# Patient Record
(demographics unavailable — no encounter records)

---

## 2018-03-20 NOTE — XMS REPORT
Clinical Summary

 Created on: 2018



Haroldo Varghese

External Reference #: [id number]

: 1966

Sex: Male



Demographics







 Address  [address] [place]  [address]

 

 Home Phone  [phone]

 

 Preferred Language  English

 

 Marital Status  

 

 Nondenominational Affiliation  Unknown

 

 Race  Unknown

 

 Ethnic Group  Non-





Author







 Author  Chris [facility]

 

 Organization  [facility]

 

 Address  Unknown

 

 Phone  Unavailable







Support







 Name  Relationship  Address  Phone

 

 Hernandez,Kandy  [employer]  [address] [place]  [address]  [phone]







Care Team Providers







 Care Team Member Name  Role  Phone

 

 ABIGAIL Miller MD  PCP  [phone]







Allergies







    



  Active Allergy   Reactions   Severity   Noted Date   Comments

 

    



  Iodinated Contrast- Oral   Hives    2016 



  And Iv Dye    

 

    



  Sulfa (Sulfonamide   Rash   Low   2016 



  Antibiotics)    

 

    



  Sulfacetamide Sodium     2016 







Current Medications







      



  Prescription   Sig.   Disp.   Refills   Start   End Date   Status



      Date  

 

      



  ACCU-CHEK LENORA PLUS TEST      20    Active



  STRP strip test strips      16  

 

      



  buPROPion XL (WELLBUTRIN     5   20    Active



  XL) 300 MG 24 hr tablet      16  

 

      



  doxazosin (CARDURA) 8 MG      20    Active



  tablet      16  

 

      



  VASCEPA 1 gram capsule   TK 1 C PO BID    3   20    Active



      16  

 

      



  lisinopril      20    Active



  (PRINIVIL,ZESTRIL) 10 MG      16  



  tablet      

 

      



  pioglitazone (ACTOS) 30      03/10/20    Active



  MG tablet      16  

 

      



  K-TAB 10 mEq CR tablet      20    Active



      16  

 

      



  CRESTOR 10 mg tablet   TK 1 T PO QD    1   20    Active



      16  

 

      



  sertraline (ZOLOFT) 100      20    Active



  MG tablet      16  

 

      



  RAPAMUNE 1 mg tablet      20    Active



      16  

 

      



  traZODone (DESYREL) 50 MG      20    Active



  tablet      16  

 

      



  atorvastatin (LIPITOR) 40   TK 1 T PO DAILY    2   20    Active



  MG tablet      16  

 

      



  NEORAL 25 mg capsule      10/03/20    Active



      16  

 

      



  ondansetron ODT   Take 1 tablet (4 mg   30 tablet   1   20    Active



  (ZOFRAN-ODT) 4 MG   total) by mouth every 8     17  



  disintegrating tablet   (eight) hours as needed     



   for nausea or vomiting.     

 

      



  albuterol (PROVENTIL) 2.5   Take 3 mL (2.5 mg total)   75 mL   1   20   Active



  mg /3 mL (0.083 %)   by nebulization every 6     17   18 



  nebulizer   (six) hours as needed for     



  solutionIndications:   wheezing.     



  Viral bronchitis      

 

      



  albuterol (PROAIR HFA) 90   INHALE 2 PUFFS BY MOUTH   8.5 g   3   20    
Active



  mcg/actuation   EVERY 6 HOURS AS NEEDED     17  



  inhalerIndications: Cough   FOR SHORTNESS OF BREATH     

 

      



  VITAMIN D2 50,000 unit   Take 1 capsule (50,000   12 capsule   1   20  
  Active



  capsuleIndications:   Units total) by mouth     17  



  Vitamin D deficiency   once a week.     

 

      



  ondansetron ODT     0   20   Discontin



  (ZOFRAN-ODT) 4 MG      16   17   ued



  disintegrating tablet      

 

      



  VITAMIN D2 50,000 unit   Take 1 capsule (50,000   12 capsule   0   20   Discontin



  capsule   Units total) by mouth     16   17   ued



   once a week.     

 

      



  allopurinol (ZYLOPRIM)      10/25/20   06/26/20   Discontin



  100 MG tablet      16   17   ued

 

      



  albuterol (PROAIR   Inhale 2 puffs every 6   18 g   1   12/15/20   06/21/20   
Discontin



  HFA,PROVENTIL   (six) hours as needed for     16   17   ued



  HFA,VENTOLIN HFA) 90   shortness of breath.     



  mcg/actuation      



  inhalerIndications: Cough      

 

      



  amoxicillin-pot   Take 1 tablet by mouth 2   20 tablet   0   20   



  clavulanate (AUGMENTIN)   (two) times a day for 10     17   17 



  875-125 mg per   days.     



  tabletIndications: Acute      



  non-recurrent maxillary      



  sinusitis, Acute      



  suppurative otitis media      



  of both ears without      



  spontaneous rupture of      



  tympanic membranes,      



  recurrence not specified      

 

      



  PROAIR HFA 90   INHALE 2 PUFFS BY MOUTH   8.5 g   0   20   
Discontin



  mcg/actuation   EVERY 6 HOURS AS NEEDED     17   17   ued



  inhalerIndications: Cough   FOR SHORTNESS OF BREATH     

 

      



  VITAMIN D2 50,000 unit   Take 1 capsule (50,000   12 capsule   0   20   Discontin



  capsuleIndications:   Units total) by mouth     17   17   ued



  Vitamin D deficiency   once a week.     

 

      



  VITAMIN D2 50,000 unit   Take 1 capsule (50,000   12 capsule   1   20   Discontin



  capsuleIndications:   Units total) by mouth     17   17   ued



  Vitamin D deficiency   once a week.     

 

      



  codeine-guaifenesin   Take 5 mL by mouth 3   236 mL   0   20
   



  (GUAIFENESIN AC)    (three) times a day as     17   17 



  mg/5 mL   needed for cough for up     



  liquidIndications: Viral   to 10 days.     



  bronchitis      

 

      



  erythromycin 0.5%   Administer 1 application   1 g   0   09/26/20   10/03/20 
  



  (ILOTYCIN) 5 mg/gram (0.5   into the left eye 2 (two)     17   17 



  %) ophthalmic   times a day for 7 days.     



  ointmentIndications:      



  Acute bacterial      



  conjunctivitis of left      



  eye      









      



  Hospital, Clinic, or   Ordered Dose   Route   Frequency   Start   End Date   
Status



  Other Facility      Date  



  Administered Medication      

 

      



  albuterol (PROVENTIL)   2.5 mg   nebu   Once   20    Active



  nebulizer solution 2.5      17  



  mgIndications: Cough, SOB      



  (shortness of breath),      



  Viral bronchitis      







Active Problems







 



  Problem   Noted Date

 

 



  Abscess   2017

 

 



  Acute pharyngitis   2017

 

 



  Acute upper respiratory infection   2017

 

 



  Ankle joint pain   2017

 

 



  Nausea   2017

 

 



  Multiple-type hyperlipidemia   2017

 

 



  Abdominal distension   2017

 

 



  Joint pain   2017

 

 



  Iron deficiency anemia   2017

 

 



  Insomnia   2017

 

 



  Hyperlipidemia   2017

 

 



  History of kidney transplant   2017

 

 



  Headache   2017

 

 



  Gouty arthropathy   2017

 

 



  Fever   2017

 

 



  Elevated blood-pressure reading without diagnosis of hypertension   2017

 

 



  Diverticulitis of sigmoid colon   2017

 

 



  Diverticulosis of intestine   2017

 

 



  Diarrhea   2017

 

 



  Benign essential hypertension   2017

 

 



  Chest pain   2017

 

 



  Conjunctivitis   2017

 

 



  Obstructive sleep apnea syndrome in adult   2017

 

 



  Cellulitis of orbit   2017

 

 



  Pain of lower extremity   2017

 

 



  Otitis media   2017

 

 



  Psychogenic headache   2017

 

 



  Renal disorder   2017

 

 



  Right flank pain   2017

 

 



  Arthralgia of shoulder   2017

 

 



  Stomatitis   2017

 

 



  Testicular hypofunction   2017

 

 



  Tremor   2017

 

 



  Urinary tract infection   2017

 

 



  Vitamin D deficiency   2017

 

 



  Diabetes mellitus   2016

 

 



  Overview:



  Overview:



  IDDM PREDNISONE INDUCED



  Overview:



  IDDM PREDNISONE INDUCED

 

 



  Gout   2016

 

 



  Hypertension   2016

 

 



  Congenital cystic kidney disease   2016

 

 



  Sleep apnea   2016

 

 



  Overview:



  Overview:



  uses cpap



  Overview:



  uses cpap

 

 



  Diverticulitis of large intestine   2016

 

 



  Benign prostatic hyperplasia   2016

 

 



  Chronic kidney disease, stage III (moderate)   2016

 

 



  Depression   2016

 

 



  Left lower quadrant pain   2016







Encounters







    



  Date   Type   Specialty   Care Team   Description

 

    



  2018   Lab   Lab   Kathi Miller MD   Multiple-type



      hyperlipidemia;



      Vitamin D deficiency;



      IGT (impaired glucose



      tolerance);



      Anemia, unspecified type

 

    



  2018   Office Visit   Internal Medicine   Kathi Miller MD   
Multiple-type



      hyperlipidemia (Primary



      Dx);



      Vitamin D deficiency;



      Need for influenza



      vaccination;



      Anemia, unspecified type;





      IGT (impaired glucose



      tolerance);



      History of diverticulitis

 

    



  2017   Office Visit   Internal Medicine   Kathi Miller MD   
Diabetes mellitus with



      insulin therapy (Primary



      Dx);



      Benign essential



      hypertension;



      Vitamin D deficiency;



      Chronic kidney disease,



      stage III (moderate);



      Acute bacterial



      conjunctivitis of left



      eye;



      Need for influenza



      vaccination

 

    



  2017   Refill   Internal Medicine   Citlaly Cruz MA   Cough

 

    



  2017   Office Visit   Internal Medicine   Kathi Miller MD   
Cough (Primary Dx);



      SOB (shortness of



      breath);



      Viral bronchitis

 

    



  2017   Telephone   Family Medicine   Ninoska Pope LVN 

 

    



  2017   Refill   Internal Medicine   Kathi Miller MD   Acute non
-recurrent



      maxillary sinusitis;



      Acute suppurative otitis



      media of both ears



      without spontaneous



      rupture of tympanic



      membranes, recurrence not



      specified

 

    



  2017   Office Visit   Internal Medicine   Kathi Miller MD   
Hoarseness (Primary Dx);



      Multiple-type



      hyperlipidemia;



      Vitamin D deficiency;



      Elevated liver function



      tests;



      Gastroesophageal reflux



      disease with esophagitis

 

    



  2017   Refill   Family Medicine   Sarika Ramsey MD   Cough

 

    



  2017   Lab   Lab   Kathi Miller MD   Multiple-type



      hyperlipidemia;



      Other iron deficiency



      anemia;



      Vitamin D deficiency;



      Type 2 diabetes mellitus



      with chronic kidney



      disease, without



      long-term current use of



      insulin, unspecified CKD



      stage;



      Localized edema;



      Benign non-nodular



      prostatic hyperplasia



      without lower urinary



      tract symptoms;



      Screening for prostate



      cancer;



      Need for hepatitis C



      screening test

 

    



  2017   Office Visit   Internal Medicine   Kathi Miller MD   
Acute non-recurrent



      maxillary sinusitis



      (Primary Dx);



      Acute suppurative otitis



      media of both ears



      without spontaneous



      rupture of tympanic



      membranes, recurrence not



      specified



after 2017



Immunizations







  



  Name   Dates Previously Given   Next Due

 

  



  INFLUENZA QUAD PF   2018, 2017 

 

  



  Influenza, Unspecified   2016, 2016 







Family History







   



  Medical History   Relation   Name   Comments

 

   



  Lupus   Daughter  

 

   



  Anorexia nervosa   Daughter  

 

   



  Diabetes   Father    after transplant

 

   



  Kidney failure   Father    [facility]KD

 

   



  Kidney failure   Son    PCKD









   



  Relation   Name   Status   Comments

 

   



  Daughter   

 

   



  Daughter   

 

   



  Father   

 

   



  Son   







Social History







    



  Tobacco Use   Types   Packs/Day   Years Used   Date

 

    



  Never Smoker    









 Tobacco Cessation: Counseling Given: No











   



  Alcohol Use   Drinks/Week   oz/Week   Comments

 

   



  No     1/year









 



  Sex Assigned at Birth   Date Recorded

 

 



  Not on file 







Last Filed Vital Signs







  



  Vital Sign   Reading   Time Taken

 

  



  Blood Pressure   112/73   2018 10:36 AM CST

 

  



  Pulse   85   2018 10:36 AM CST

 

  



  Temperature   36.6   C (97.8   F)   2018 10:36 AM CST

 

  



  Respiratory Rate   18   2018 10:36 AM CST

 

  



  Oxygen Saturation   95%   2018 10:36 AM CST

 

  



  Inhaled Oxygen   -   -



  Concentration  

 

  



  Weight   126 kg (278 lb)   2018 10:36 AM CST

 

  



  Height   172.7 cm (5' 8")   2017 10:15 AM CDT

 

  



  Body Mass Index   42.27   2018 10:36 AM CST







Plan of Treatment







   



  Health Maintenance   Due Date   Last Done   Comments

 

   



  FOOT EXAM   1976  

 

   



  OPHTHALMOLOGY EXAM   1976  

 

   



  COLONOSCOPY   2016  

 

   



  INFLUENZA VACCINE   Completed   2018, 2017, 2016, 



    Additional history exists 







Results

* Vitamin D 25 hydroxy level (2018 11:34 AM)



Only the most recent of 2 results within the time period is included.





  



  Component   Value   Ref Range

 

  



  Vitamin D, 25-hydroxy   22.6 (L)   30.0 - 100.0 ng/mL



   Comment: 



   Vitamin D deficiency has been defined by the 



   [place] of 



   Medicine and an Endocrine Society practice 



   guideline as a 



   level of serum 25-OH vitamin D less than 20 ng/mL 



   (1,2). 



   The Endocrine Society went on to further define 



   vitamin D 



   insufficiency as a level between 21 and 29 ng/mL 



   (2). 



   1. IOM ([place] [employer]). 2010. Dietary 



   reference 



   intakes for calcium and D. [place]: The 



   National Academies Press. 



   2. Michael MF, Jasen NC, Sugey THIBODEAUX, et 



   al. 



   Evaluation, treatment, and prevention of 



   vitamin D 



   deficiency: an Endocrine Society clinical 



   practice 



   guideline. JCEM. 2011; 96(9):1911-30. 









 



  Specimen   Performing Laboratory

 

 



  Blood   [facility]









 Narrative

 

 



Performed at:[address] [place], [place] [id number]



: Nicola Keller MD, Phone:[phone]





* Iron level (2018 11:34 AM)



Only the most recent of 2 results within the time period is included.





  



  Component   Value   Ref Range

 

  



  Iron level   75   38 - 169 ug/dL









 



  Specimen   Performing Laboratory

 

 



  Blood   [facility]









 Narrative

 

 



Performed at:[address] [place], [place] [id number]



: Nicola Keller MD, Phone:[phone]





* Hemoglobin A1c (2018 11:34 AM)



Only the most recent of 2 results within the time period is included.





  



  Component   Value   Ref Range

 

  



  Hemoglobin A1C   6.2 (H)   4.8 - 5.6 %



   Comment: 



   Pre-diabetes: 5.7 - 6.4 



   Diabetes: >6.4 



   Glycemic control for adults with 



   diabetes: <7.0 









 



  Specimen   Performing Laboratory

 

 



  Blood   [facility]









 Narrative

 

 



Performed at:[address] [place], [place] [id number]



: Nicola Keller MD, Phone:[phone]





* Ferritin level (2018 11:34 AM)



Only the most recent of 2 results within the time period is included.





  



  Component   Value   Ref Range

 

  



  Ferritin level   416 (H)   30 - 400 ng/mL









 



  Specimen   Performing Laboratory

 

 



  Blood   [facility]









 Narrative

 

 



Performed at:[address] [place], [place] [id number]



: Nicola Keller MD, Phone:[phone]





* Lipid panel (2018 11:34 AM)



Only the most recent of 2 results within the time period is included.





  



  Component   Value   Ref Range

 

  



  Cholesterol   174   100 - 199 mg/dL

 

  



  Triglycerides   246 (H)   0 - 149 mg/dL

 

  



  HDL cholesterol   43   >39 mg/dL

 

  



  VLDL cholesterol sylvie   49 (H)   5 - 40 mg/dL

 

  



  LDL cholesterol   82   0 - 99 mg/dL



  calculated  

 

  



  Non-HDL cholesterol   131 (H)   0 - 129 mg/dL









 



  Specimen   Performing Laboratory

 

 



  Blood   [facility]









 Narrative

 

 



Performed at: - [id number]



: Nicola Keller MD, Phone:[phone]





* XR Chest 2 Vw (2017 11:44 AM)





 



  Specimen   Performing Laboratory

 

 



    [facility]



   [address] [place] [address]









 Narrative

 

 



EXAMINATION:XR CHEST 2 VW



 



CLINICAL HISTORY:R05 Cough, Cough, transplant patient



 



COMPARISON:



2016 .



 



IMPRESSION:



 



1.The heart size is at upper limits of normal.



 



2.No focal areas of consolidation are identified. There is mild 
peribronchial wall thickening however this is unchanged from 2016 
and may relate to bronchitis or the patient's underlying volume status.



 



3.A pleural effusion or pneumothorax is not identified. Osseous structures 
are intact.



 



4.Overall, there has been no interval change from prior.



 



[id number]



 









 Procedure Note

 

 



Hm Interface, Radiology Results Incoming - 2017  1:11 PM CDT



EXAMINATION:  XR CHEST 2 VW



CLINICAL HISTORY:  R05 Cough, Cough, transplant patient



COMPARISON:

2016 .



IMPRESSION:



 1.  The heart size is at upper limits of normal.



 2.  No focal areas of consolidation are identified. There is mild 
peribronchial wall thickening however this is unchanged from 2016 
and may relate to bronchitis or the patient's underlying volume status.



 3.  A pleural effusion or pneumothorax is not identified. Osseous structures 
are intact.



 4.  Overall, there has been no interval change from prior.



[id number]







* FL Upper GI (2017  9:45 AM)





 



  Specimen   Performing Laboratory

 

 



    [facility]



   [address] [place] [address]









 Narrative

 

 



EXAMINATION:FL UPPER GI



 



CLINICAL HISTORY:K21.0 Gastro-esophageal reflux disease with esophagitis, 
increased GERD



 



COMPARISON:None.



 



TECHNIQUE:UPPER GI SERIES was performed with effervescent granules and 
barium.



 



FLUOROSCOPIC TIME:2 minutes



 



IMAGES:26



 



IMPRESSION:



 



1.Esophagus:Esophagus was distensible. The mucosa and motility were 
within normal limits. 



 



2.Gastroesophageal junction:There is a small intermittent sliding 
hiatal hernia (image 9). There was some spontaneous gastroesophageal reflux to 
the level of the midesophagus.



 



3.Stomach:Normally distensible and demonstrates normal contours and 
mucosal pattern.



 



4.Duodenum:Bulb and sweep are normal. The duodenal-jejunal junction is 
in the normal expected position. There is a diverticulum from the second stage 
of the duodenum. Surgical clips over the left upper quadrant.



 



 



 



 



 



[id number]



 









 Procedure Note

 

 



 Interface, Radiology Results Incoming - 2017 11:29 AM CDT



EXAMINATION:  FL UPPER GI



CLINICAL HISTORY:  K21.0 Gastro-esophageal reflux disease with esophagitis, 
increased GERD



COMPARISON:  None.



TECHNIQUE:  UPPER GI SERIES was performed with effervescent granules and barium.



FLUOROSCOPIC TIME:  2 minutes



IMAGES:  26



IMPRESSION:



 1.  Esophagus:  Esophagus was distensible. The mucosa and motility were within 
normal limits. 



 2.  Gastroesophageal junction:  There is a small intermittent sliding hiatal 
hernia (image 9). There was some spontaneous gastroesophageal reflux to the 
level of the midesophagus.



 3.  Stomach:  Normally distensible and demonstrates normal contours and 
mucosal pattern.



 4.  Duodenum:  Bulb and sweep are normal. The duodenal-jejunal junction is in 
the normal expected position. There is a diverticulum from the second stage of 
the duodenum. Surgical clips over the left upper quadrant.











[id number]







* US Abdomen Complete (2017  9:25 AM)





 



  Specimen   Performing Laboratory

 

 



   [address] [address]









 Narrative

 

 



EXAM: US ABDOMEN COMPLETE



 



CLINICAL DATA:R79.89 Other specified abnormal findings of blood chemistry, 
ABNORMAL LIVER FUNCTION TESTS, elevated LE's new onset



 



COMPARISON: NONE.



 



FINDINGS:



 



PANCREAS:The visualized portions of the pancreas are within normal limits.



 



LIVER:The liver demonstrates heterogeneous and slightly hyperechoic 
echotexture. There is no focal liver lesion identified. Negative for dilation 
of intra or extrahepatic grade ducts.



 



MPV:Doppler evaluation of the portal vein demonstrates normal hepatopedal 
flow. measuring 1.3 cm. 



 



CBD:6 within normal limits.



 



GALLBLADDER:The gallbladder is without evidence of calculi. The gallbladder 
wall is not thickened and there is no pericholecystic fluid.



 



Multiple cysts seen within the right kidney with largest measuring 3 cm. No 
visualized suspicious mass. Status post left nephrectomy.



 



Transplanted kidney located in the right lower quadrant measuring 9.8 x 6.2 x 
5.8 cm was cortex measuring 1.44 cm. Mild dilation of right upper pole cavities.



 



 



SPLEEN:The spleen is homogeneous and not enlarged measuring9.5 cm 



 



AORTA:The visualized upper abdominal aorta demonstrates no evidence of 
ectasia or aneurysm.



 



IVC:The visualized portions of the inferior vena cava are unremarkable.



 



 



 



IMPRESSION:



1. The liver demonstrates heterogeneous and slightly hyperechoic echotexture 
without focal lesion or dilation ofintra or extrahepatic biliary ducts.



2. Negative for cholelithiasis or cholecystitis.



3. Transplanted kidney located in the right lower quadrant. Mild dilation of 
upper pole cavities likely physiologic. Negative for nephrolithiasis.



4. Native right kidney demonstrates multiple cysts with largest measuring 3 cm 
consistent with known patient history of polycystic kidney disease.



5. Status post left nephrectomy. Unremarkable surgical site.



 



 



[id number]



 









 Procedure Note

 

 



Hm Interface, Radiology Results Incoming - 2017  9:56 AM CDT



EXAM: US ABDOMEN COMPLETE



CLINICAL DATA:  R79.89 Other specified abnormal findings of blood chemistry, 
ABNORMAL LIVER FUNCTION TESTS, elevated LE's new onset



COMPARISON: NONE.



FINDINGS:



PANCREAS:  The visualized portions of the pancreas are within normal limits.



LIVER:  The liver demonstrates heterogeneous and slightly hyperechoic 
echotexture. There is no focal liver lesion identified. Negative for dilation 
of intra or extrahepatic grade ducts.



MPV:  Doppler evaluation of the portal vein demonstrates normal hepatopedal 
flow. measuring 1.3 cm. 



CBD:  6 within normal limits.



GALLBLADDER:  The gallbladder is without evidence of calculi. The gallbladder 
wall is not thickened and there is no pericholecystic fluid.



Multiple cysts seen within the right kidney with largest measuring 3 cm. No 
visualized suspicious mass. Status post left nephrectomy.



Transplanted kidney located in the right lower quadrant measuring 9.8 x 6.2 x 
5.8 cm was cortex measuring 1.44 cm. Mild dilation of right upper pole cavities.





SPLEEN:  The spleen is homogeneous and not enlarged measuring  9.5 cm 



AORTA:  The visualized upper abdominal aorta demonstrates no evidence of 
ectasia or aneurysm.



IVC:  The visualized portions of the inferior vena cava are unremarkable.







IMPRESSION:

 1. The liver demonstrates heterogeneous and slightly hyperechoic echotexture 
without focal lesion or dilation of  intra or extrahepatic biliary ducts.

 2. Negative for cholelithiasis or cholecystitis.

 3. Transplanted kidney located in the right lower quadrant. Mild dilation of 
upper pole cavities likely physiologic. Negative for nephrolithiasis.

 4. Native right kidney demonstrates multiple cysts with largest measuring 3 cm 
consistent with known patient history of polycystic kidney disease.

 5. Status post left nephrectomy. Unremarkable surgical site.





[id number]







* Hepatitis acute panel (2017  9:53 AM)





  



  Component   Value   Ref Range

 

  



  Hepatitis A IgM   Negative   Negative

 

  



  Hepatitis B surface Ag   Negative   Negative

 

  



  Hepatitis B core IgM   Negative   Negative

 

  



  Hepatitis C Ab   <0.1   0.0 - 0.9 s/co ratio



   Comment: 



   Negative:         < 0.8 



   Indeterminate: 0.8 - 0.9 



   Positive:         > 0.9 



   The CDC recommends that a positive HCV antibody 



   result 



   be followed up with a HCV Nucleic Acid 



   Amplification 



   test (996753). 









 



  Specimen   Performing Laboratory

 

 



  Blood   [facility]









 Narrative

 

 



Performed at: - [id number]



: Nicola Keller MD, Phone:[phone]





* CBC with platelet and differential (2017  9:53 AM)





  



  Component   Value   Ref Range

 

  



  WBC   5.4   3.4 - 10.8 x10E3/uL

 

  



  RBC   3.90 (L)   4.14 - 5.80 x10E6/uL

 

  



  HGB   10.8 (L)   12.6 - 17.7 g/dL

 

  



  HCT   33.5 (L)   37.5 - 51.0 %

 

  



  MCV   86   79 - 97 fL

 

  



  MCH   27.7   26.6 - 33.0 pg

 

  



  MCHC   32.2   31.5 - 35.7 g/dL

 

  



  RDW   16.4 (H)   12.3 - 15.4 %

 

  



  Platelet count   168   150 - 379 x10E3/uL

 

  



  Neutrophils   61   %

 

  



  Lymphocytes   26   %

 

  



  Monocytes   6   %

 

  



  Eosinophils   7   %

 

  



  Basophils   0   %

 

  



  Neutrophils, absolute   3.2   1.4 - 7.0 x10E3/uL

 

  



  Lymphocytes, absolute   1.4   0.7 - 3.1 x10E3/uL

 

  



  Monocytes, absolute   0.3   0.1 - 0.9 x10E3/uL

 

  



  Eosinophils, absolute   0.4   0.0 - 0.4 x10E3/uL

 

  



  Basophils, absolute   0.0   0.0 - 0.2 x10E3/uL

 

  



  Immature granulocytes   0   %

 

  



  Immature grans (abs)   0.0   0.0 - 0.1 x10E3/uL









 



  Specimen   Performing Laboratory

 

 



  Blood   [facility]









 Narrative

 

 



Performed at:[address] [place], [place] [id number]



: Nicola Keller MD, Phone:[phone]





* T3, free (2017  9:53 AM)





  



  Component   Value   Ref Range

 

  



  T3, free   3.3   2.0 - 4.4 pg/mL









 



  Specimen   Performing Laboratory

 

 



  Blood   [facility]









 Narrative

 

 



Performed at:[address] [place], [place] [id number]



: Nicola Keller MD, Phone:[phone]





* Thyroid stimulating hormone (2017  9:53 AM)





  



  Component   Value   Ref Range

 

  



  TSH   4.420   0.450 - 4.500 uIU/mL









 



  Specimen   Performing Laboratory

 

 



  Blood   [facility]









 Narrative

 

 



Performed at:[address] [place], [place] [id number]



: Nicola Keller MD, Phone:[phone]





* Prostate specific antigen (2017  9:53 AM)





  



  Component   Value   Ref Range

 

  



  PSA   0.7   0.0 - 4.0 ng/mL



   Comment: 



   Roche ECLIA methodology. 



   According to the American Urological Association, 



   Serum PSA should 



   decrease and remain at undetectable levels after 



   radical 



   prostatectomy. The AUA defines biochemical 



   recurrence as an initial 



   PSA value 0.2 ng/mL or greater followed by a 



   subsequent confirmatory 



   PSA value 0.2 ng/mL or greater. 



   Values obtained with different assay methods or 



   kits cannot be used 



   interchangeably. Results cannot be interpreted as 



   absolute evidence 



   of the presence or absence of malignant disease. 









 



  Specimen   Performing Laboratory

 

 



  Blood   [facility]









 Narrative

 

 



Performed at:[address] [place], [place] [id number]



: Nicola Keller MD, Phone:[phone]





* Vitamin B12 level (2017  9:53 AM)





  



  Component   Value   Ref Range

 

  



  Vitamin B12   1,471 (H)   211 - 946 pg/mL









 



  Specimen   Performing Laboratory

 

 



  Blood   [facility]









 Narrative

 

 



Performed at:[address] [place], [place] [id number]



: Nicola Keller MD, Phone:[phone]





* Comprehensive metabolic panel (2017  9:53 AM)





  



  Component   Value   Ref Range

 

  



  Glucose   104 (H)   65 - 99 mg/dL

 

  



  BUN, whole blood   43 (H)   6 - 24 mg/dL

 

  



  Creatinine   1.79 (H)   0.76 - 1.27 mg/dL

 

  



  EGFR Non-Afr. American   43 (L)   >59 mL/min/1.73

 

  



  EGFR    50 (L)   >59 mL/min/1.73

 

  



  BUN/creatinine ratio   24 (H)   9 - 20

 

  



  Sodium   143   134 - 144 mmol/L

 

  



  Potassium   4.2   3.5 - 5.2 mmol/L

 

  



  Chloride   106   96 - 106 mmol/L

 

  



  CO2   21   18 - 29 mmol/L

 

  



  Calcium   8.9   8.7 - 10.2 mg/dL

 

  



  Protein   6.6   6.0 - 8.5 g/dL

 

  



  Albumin, S   4.0   3.5 - 5.5 g/dL

 

  



  Globulin, total   2.6   1.5 - 4.5 g/dL

 

  



  Albumin/globulin ratio   1.5   1.2 - 2.2

 

  



  Total bilirubin   0.5   0.0 - 1.2 mg/dL

 

  



  Alkaline phosphatase   138 (H)   39 - 117 IU/L

 

  



  AST   45 (H)   0 - 40 IU/L

 

  



  ALT   46 (H)   0 - 44 IU/L









 



  Specimen   Performing Laboratory

 

 



  Blood   [facility]









 Narrative

 

 



Performed at: [facility] [id number]



: Nicola Keller MD, Phone:[phone]





after 2017



Insurance







     



  Payer   Benefit   Subscriber ID   Type   Phone   Address



   Plan /    



   Group    

 

     



  MEDICARE   MEDICARE   xxxxxxxxxx   Medicare    [place], [place]



   PART A AND    



   B    

 

     



  BCBS   BCBS   xxxxxxxxx   PPO  



   CHOICE    



   PPO/[employer] PPO    









     



  Guarantor Name   Account   Relation to   Date of   Phone   Billing Address



   Type   Patient   Birth  

 

     



  HAROLDO VARGHESE   Personal/F   Self   1966   Home:   [address]     [phone]   [place] [address]

## 2018-03-20 NOTE — XMS REPORT
Patient Summary Document

 Created on: 2018



ANTONIO SONI

External Reference #: [id number]

: 1966

Sex: Male



Demographics







 Address  [address] [place]  [address]

 

 Home Phone  [phone]

 

 Preferred Language  Unknown

 

 Marital Status  Unknown

 

 Taoist Affiliation  Unknown

 

 Race  Unknown

 

 Ethnic Group  Unknown





Author







 Author  [facility]

 

 Organization  [facility]

 

 Address  Unknown

 

 Phone  Unavailable







Care Team Providers







 Care Team Member Name  Role  Phone

 

 BEATRIZ ROBERTO  Unavailable  Unavailable







Problems

This patient has no known problems.



Allergies, Adverse Reactions, Alerts

This patient has no known allergies or adverse reactions.



Medications

This patient has no known medications.



Results







 Test Description  Test Time  Test Comments  Text Results  Atomic Results  
Result Comments









 BLOOD CULTURE  2017 23:00:00       









   

 

 CULTURE (BEAKER) (test code=1095)  No growth in 5 days      





BLOOD [id number] 23:00:00* 





 Test Item  Value  Reference Range  Comments

 

 CULTURE (BEAKER) (test code=1095)  No growth in 5 days      





POCT-GLUCOSE [id number] 07:52:00* 





 Test Item  Value  Reference Range  Comments

 

 POC-GLUCOSE METER (BEAKER) (test code=1538)  93 mg/dL    TESTED AT [facility] 
[address] [facility] [address]





BASIC METABOLIC [id number] 06:18:00* 





 Test Item  Value  Reference Range  Comments

 

 SODIUM (BEAKER) (test code=381)  141 meq/L  136-145   

 

 POTASSIUM (BEAKER) (test code=379)  4.6 meq/L  3.5-5.1   

 

 CHLORIDE (BEAKER) (test code=382)  114 meq/L     

 

 CO2 (BEAKER) (test code=355)  20 meq/L  22-29   

 

 BLOOD UREA NITROGEN (BEAKER) (test code=354)  25 mg/dL  7-21   

 

 CREATININE (BEAKER) (test code=358)  1.70 mg/dL  0.57-1.25   

 

 GLUCOSE RANDOM (BEAKER) (test code=652)  96 mg/dL     

 

 CALCIUM (BEAKER) (test code=697)  8.7 mg/dL  8.4-10.2   

 

 EGFR (BEAKER) (test code=1092)   mL/min/1.73 sq m     INSUFFICIENT CLINICAL 
DATA TO CALCULATE ESTIMATED GFR.





[id number] 06:14:00* 





 Test Item  Value  Reference Range  Comments

 

 PHOSPHORUS (BEAKER) (test code=604)  2.6 mg/dL  2.3-4.7   





[id number] 06:14:00* 





 Test Item  Value  Reference Range  Comments

 

 MAGNESIUM (BEAKER) (test code=627)  1.7 mg/dL  1.6-2.6   





CBC W/PLT COUNT & AUTO [id number] 05:44:00* 





 Test Item  Value  Reference Range  Comments

 

 WHITE BLOOD CELL COUNT (BEAKER) (test code=775)  3.4 K/ L  3.5-10.5   

 

 RED BLOOD CELL COUNT (BEAKER) (test code=761)  3.34 M/ L  4.63-6.08   

 

 HEMOGLOBIN (BEAKER) (test code=410)  9.2 GM/DL  13.7-17.5   

 

 HEMATOCRIT (BEAKER) (test code=411)  30.4 %  40.1-51.0   

 

 MEAN CORPUSCULAR VOLUME (BEAKER) (test code=753)  91.0 fL  79.0-92.2   

 

 MEAN CORPUSCULAR HEMOGLOBIN (BEAKER) (test code=751)  27.5 pg  25.7-32.2   

 

 MEAN CORPUSCULAR HEMOGLOBIN CONC (BEAKER) (test code=752)  30.3 GM/DL  32.3-
36.5   

 

 RED CELL DISTRIBUTION WIDTH (BEAKER) (test code=412)  15.3 %  11.6-14.4   

 

 PLATELET COUNT (BEAKER) (test code=756)  151 K/CU MM  150-450   

 

 MEAN PLATELET VOLUME (BEAKER) (test code=754)  10.5 fL  9.4-12.4   

 

 NUCLEATED RED BLOOD CELLS (BEAKER) (test code=413)  0 /100 WBC  0-0   

 

 NEUTROPHILS RELATIVE PERCENT (BEAKER) (test code=429)  39 %      

 

 LYMPHOCYTES RELATIVE PERCENT (BEAKER) (test code=430)  45 %      

 

 MONOCYTES RELATIVE PERCENT (BEAKER) (test code=431)  8 %      

 

 EOSINOPHILS RELATIVE PERCENT (BEAKER) (test code=432)  7 %      

 

 BASOPHILS RELATIVE PERCENT (BEAKER) (test code=437)  1 %      

 

 NEUTROPHILS ABSOLUTE COUNT (BEAKER) (test code=670)  1.33 K/ L  1.78-5.38   

 

 LYMPHOCYTES ABSOLUTE COUNT (BEAKER) (test code=414)  1.53 K/ L  1.32-3.57   

 

 MONOCYTES ABSOLUTE COUNT (BEAKER) (test code=415)  0.27 K/ L  0.30-0.82   

 

 EOSINOPHILS ABSOLUTE COUNT (BEAKER) (test code=416)  0.22 K/ L  0.04-0.54   

 

 BASOPHILS ABSOLUTE COUNT (BEAKER) (test code=417)  0.02 K/ L  0.01-0.08   

 

 IMMATURE GRANULOCYTES-RELATIVE PERCENT (BEAKER) (test code=2801)  1 %  0-1   





POCT-GLUCOSE [id number] 23:04:00* 





 Test Item  Value  Reference Range  Comments

 

 POC-GLUCOSE METER (BEAKER) (test code=1538)  117 mg/dL    TESTED AT 
[address] [address]





POCT-GLUCOSE [id number] 17:54:00* 





 Test Item  Value  Reference Range  Comments

 

 POC-GLUCOSE METER (BEAKER) (test code=1538)  175 mg/dL    TESTED AT 
[address] [address]





POCT-GLUCOSE [id number] 12:09:00* 





 Test Item  Value  Reference Range  Comments

 

 POC-GLUCOSE METER (BEAKER) (test code=1538)  185 mg/dL    TESTED AT 
[address] [address]





POCT-GLUCOSE [id number] 08:17:00* 





 Test Item  Value  Reference Range  Comments

 

 POC-GLUCOSE METER (BEAKER) (test code=1538)  109 mg/dL    TESTED AT 
[address] [address]





BASIC METABOLIC [id number] 06:54:00* 





 Test Item  Value  Reference Range  Comments

 

 SODIUM (BEAKER) (test code=381)  140 meq/L  136-145   

 

 POTASSIUM (BEAKER) (test code=379)  4.6 meq/L  3.5-5.1   

 

 CHLORIDE (BEAKER) (test code=382)  112 meq/L     

 

 CO2 (BEAKER) (test code=355)  20 meq/L  22-29   

 

 BLOOD UREA NITROGEN (BEAKER) (test code=354)  27 mg/dL  7-21   

 

 CREATININE (BEAKER) (test code=358)  1.72 mg/dL  0.57-1.25   

 

 GLUCOSE RANDOM (BEAKER) (test code=652)  117 mg/dL     

 

 CALCIUM (BEAKER) (test code=697)  8.2 mg/dL  8.4-10.2   

 

 EGFR (BEAKER) (test code=1092)   mL/min/1.73 sq m     INSUFFICIENT CLINICAL 
DATA TO CALCULATE ESTIMATED GFR.





[id number] 06:43:00* 





 Test Item  Value  Reference Range  Comments

 

 PHOSPHORUS (BEAKER) (test code=604)  2.4 mg/dL  2.3-4.7   





[id number] 06:43:00* 





 Test Item  Value  Reference Range  Comments

 

 MAGNESIUM (BEAKER) (test code=627)  1.4 mg/dL  1.6-2.6   





CBC W/PLT COUNT & AUTO [id number] 06:12:00* 





 Test Item  Value  Reference Range  Comments

 

 WHITE BLOOD CELL COUNT (BEAKER) (test code=775)  3.0 K/ L  3.5-10.5   

 

 RED BLOOD CELL COUNT (BEAKER) (test code=761)  3.21 M/ L  4.63-6.08   

 

 HEMOGLOBIN (BEAKER) (test code=410)  8.9 GM/DL  13.7-17.5   

 

 HEMATOCRIT (BEAKER) (test code=411)  29.1 %  40.1-51.0   

 

 MEAN CORPUSCULAR VOLUME (BEAKER) (test code=753)  90.7 fL  79.0-92.2   

 

 MEAN CORPUSCULAR HEMOGLOBIN (BEAKER) (test code=751)  27.7 pg  25.7-32.2   

 

 MEAN CORPUSCULAR HEMOGLOBIN CONC (BEAKER) (test code=752)  30.6 GM/DL  32.3-
36.5   

 

 RED CELL DISTRIBUTION WIDTH (BEAKER) (test code=412)  15.1 %  11.6-14.4   

 

 PLATELET COUNT (BEAKER) (test code=756)  143 K/CU MM  150-450   

 

 MEAN PLATELET VOLUME (BEAKER) (test code=754)  10.6 fL  9.4-12.4   

 

 NUCLEATED RED BLOOD CELLS (BEAKER) (test code=413)  0 /100 WBC  0-0   

 

 NEUTROPHILS RELATIVE PERCENT (BEAKER) (test code=429)  40 %      

 

 LYMPHOCYTES RELATIVE PERCENT (BEAKER) (test code=430)  43 %      

 

 MONOCYTES RELATIVE PERCENT (BEAKER) (test code=431)  9 %      

 

 EOSINOPHILS RELATIVE PERCENT (BEAKER) (test code=432)  8 %      

 

 BASOPHILS RELATIVE PERCENT (BEAKER) (test code=437)  1 %      

 

 NEUTROPHILS ABSOLUTE COUNT (BEAKER) (test code=670)  1.18 K/ L  1.78-5.38   

 

 LYMPHOCYTES ABSOLUTE COUNT (BEAKER) (test code=414)  1.27 K/ L  1.32-3.57   

 

 MONOCYTES ABSOLUTE COUNT (BEAKER) (test code=415)  0.26 K/ L  0.30-0.82   

 

 EOSINOPHILS ABSOLUTE COUNT (BEAKER) (test code=416)  0.24 K/ L  0.04-0.54   

 

 BASOPHILS ABSOLUTE COUNT (BEAKER) (test code=417)  0.03 K/ L  0.01-0.08   

 

 IMMATURE GRANULOCYTES-RELATIVE PERCENT (BEAKER) (test code=2801)  0 %  0-1   





POCT-GLUCOSE [id number] 21:03:00* 





 Test Item  Value  Reference Range  Comments

 

 POC-GLUCOSE METER (BEAKER) (test code=1538)  191 mg/dL    TESTED AT 
[address] [address]





POCT-GLUCOSE [id number] 17:11:00* 





 Test Item  Value  Reference Range  Comments

 

 POC-GLUCOSE METER (BEAKER) (test code=1538)  94 mg/dL    TESTED AT [address] [address]





CYCLOSPORINE [id number] 13:10:00* 





 Test Item  Value  Reference Range  Comments

 

 CYCLOSPORINE BLOOD (BEAKER) (test code=672)  64 ng/mL  <400   





POCT-GLUCOSE [id number] 12:06:00* 





 Test Item  Value  Reference Range  Comments

 

 POC-GLUCOSE METER (BEAKER) (test code=1538)  93 mg/dL    TESTED AT [address] [address]





SIROLIMUS [id number] 12:01:00* 





 Test Item  Value  Reference Range  Comments

 

 SIROLIMUS LEVEL BLOOD (BEAKER) (test code=806)  9.0 ng/mL  5.0-15.0   





CBC W/PLT COUNT & AUTO [id number] 07:07:00* 





 Test Item  Value  Reference Range  Comments

 

 WHITE BLOOD CELL COUNT (BEAKER) (test code=775)  3.7 K/ L  3.5-10.5   

 

 RED BLOOD CELL COUNT (BEAKER) (test code=761)  3.13 M/ L  4.63-6.08   

 

 HEMOGLOBIN (BEAKER) (test code=410)  8.5 GM/DL  13.7-17.5   

 

 HEMATOCRIT (BEAKER) (test code=411)  28.2 %  40.1-51.0   

 

 MEAN CORPUSCULAR VOLUME (BEAKER) (test code=753)  90.1 fL  79.0-92.2   

 

 MEAN CORPUSCULAR HEMOGLOBIN (BEAKER) (test code=751)  27.2 pg  25.7-32.2   

 

 MEAN CORPUSCULAR HEMOGLOBIN CONC (BEAKER) (test code=752)  30.1 GM/DL  32.3-
36.5   

 

 RED CELL DISTRIBUTION WIDTH (BEAKER) (test code=412)  15.1 %  11.6-14.4   

 

 PLATELET COUNT (BEAKER) (test code=756)  146 K/CU MM  150-450   

 

 MEAN PLATELET VOLUME (BEAKER) (test code=754)  10.9 fL  9.4-12.4   

 

 NUCLEATED RED BLOOD CELLS (BEAKER) (test code=413)  0 /100 WBC  0-0   

 

 NEUTROPHILS RELATIVE PERCENT (BEAKER) (test code=429)  44 %      

 

 LYMPHOCYTES RELATIVE PERCENT (BEAKER) (test code=430)  41 %      

 

 MONOCYTES RELATIVE PERCENT (BEAKER) (test code=431)  9 %      

 

 EOSINOPHILS RELATIVE PERCENT (BEAKER) (test code=432)  6 %      

 

 BASOPHILS RELATIVE PERCENT (BEAKER) (test code=437)  1 %      

 

 NEUTROPHILS ABSOLUTE COUNT (BEAKER) (test code=670)  1.60 K/ L  1.78-5.38   

 

 LYMPHOCYTES ABSOLUTE COUNT (BEAKER) (test code=414)  1.49 K/ L  1.32-3.57   

 

 MONOCYTES ABSOLUTE COUNT (BEAKER) (test code=415)  0.34 K/ L  0.30-0.82   

 

 EOSINOPHILS ABSOLUTE COUNT (BEAKER) (test code=416)  0.21 K/ L  0.04-0.54   

 

 BASOPHILS ABSOLUTE COUNT (BEAKER) (test code=417)  0.02 K/ L  0.01-0.08   

 

 IMMATURE GRANULOCYTES-RELATIVE PERCENT (BEAKER) (test code=2801)  0 %  0-1   





BASIC METABOLIC [id number] 06:22:00* 





 Test Item  Value  Reference Range  Comments

 

 SODIUM (BEAKER) (test code=381)  139 meq/L  136-145   

 

 POTASSIUM (BEAKER) (test code=379)  4.3 meq/L  3.5-5.1   

 

 CHLORIDE (BEAKER) (test code=382)  111 meq/L     

 

 CO2 (BEAKER) (test code=355)  22 meq/L  22-29   

 

 BLOOD UREA NITROGEN (BEAKER) (test code=354)  30 mg/dL  7-21   

 

 CREATININE (BEAKER) (test code=358)  1.64 mg/dL  0.57-1.25   

 

 GLUCOSE RANDOM (BEAKER) (test code=652)  98 mg/dL     

 

 CALCIUM (BEAKER) (test code=697)  8.5 mg/dL  8.4-10.2   

 

 EGFR (BEAKER) (test code=1092)   mL/min/1.73 sq m     INSUFFICIENT CLINICAL 
DATA TO CALCULATE ESTIMATED GFR.





[id number] 06:13:00* 





 Test Item  Value  Reference Range  Comments

 

 PHOSPHORUS (BEAKER) (test code=604)  2.3 mg/dL  2.3-4.7   





[id number] 06:13:00* 





 Test Item  Value  Reference Range  Comments

 

 MAGNESIUM (BEAKER) (test code=627)  1.4 mg/dL  1.6-2.6   





POCT-GLUCOSE [id number] 05:19:00* 





 Test Item  Value  Reference Range  Comments

 

 POC-GLUCOSE METER (BEAKER) (test code=1538)  101 mg/dL    TESTED AT 
[address] [address]





POCT-GLUCOSE [id number] 04:57:00* 





 Test Item  Value  Reference Range  Comments

 

 POC-GLUCOSE METER (BEAKER) (test code=1538)  108 mg/dL    TESTED AT 
[address] [address]





POCT-GLUCOSE [id number] 21:14:00* 





 Test Item  Value  Reference Range  Comments

 

 POC-GLUCOSE METER (BEAKER) (test code=1538)  99 mg/dL    TESTED AT [address] [address]





BASIC METABOLIC [id number] 16:24:00* 





 Test Item  Value  Reference Range  Comments

 

 SODIUM (BEAKER) (test code=381)  140 meq/L  135-148   

 

 POTASSIUM (BEAKER) (test code=379)  4.3 meq/L  3.6-5.5   

 

 CHLORIDE (BEAKER) (test code=382)  105 meq/L     

 

 CO2 (BEAKER) (test code=355)  22 meq/L  24-32   

 

 BLOOD UREA NITROGEN (BEAKER) (test code=354)  35 mg/dL  10-26   

 

 CREATININE (BEAKER) (test code=358)  1.77 mg/dL  0.50-1.20   

 

 GLUCOSE RANDOM (BEAKER) (test code=652)  102 mg/dL     

 

 CALCIUM (BEAKER) (test code=697)  9.0 mg/dL  8.5-10.5   

 

 EGFR (BEAKER) (test code=1092)   mL/min/1.73 sq m     INSUFFICIENT CLINICAL 
DATA TO CALCULATE ESTIMATED GFR.





HEPATIC FUNCTION [id number] 16:15:00* 





 Test Item  Value  Reference Range  Comments

 

 TOTAL PROTEIN (BEAKER) (test code=770)  6.8 gm/dL  6.0-8.5   

 

 ALBUMIN (BEAKER) (test code=1145)  3.5 g/dL  3.5-5.0   

 

 BILIRUBIN TOTAL (BEAKER) (test code=377)  0.9 mg/dL  0.1-1.2   

 

 BILIRUBIN DIRECT (BEAKER) (test code=706)  0.6 mg/dL  0.0-0.4   

 

 ALKALINE PHOSPHATASE (BEAKER) (test code=346)  112 U/L     

 

 AST (SGOT) (BEAKER) (test code=353)  31 U/L  5-40   

 

 ALT (SGPT) (BEAKER) (test code=347)  38 U/L  5-50   





CBC W/PLT COUNT & AUTO [id number] 16:10:00* 





 Test Item  Value  Reference Range  Comments

 

 WHITE BLOOD CELL COUNT (BEAKER) (test code=775)  4.5 10e3/ L  4.0-10.0   

 

 RED BLOOD CELL COUNT (BEAKER) (test code=761)  3.47 10e6/ L  4.20-5.80   

 

 HEMOGLOBIN (BEAKER) (test code=410)  9.8 g/dL  13.0-16.8   

 

 HEMATOCRIT (BEAKER) (test code=411)  29.6 %  40.0-50.0   

 

 MEAN CORPUSCULAR VOLUME (BEAKER) (test code=753)  85.4 fL  82.0-98.0   

 

 MEAN CORPUSCULAR HEMOGLOBIN (BEAKER) (test code=751)  28.3 pg  27.0-33.0   

 

 MEAN CORPUSCULAR HEMOGLOBIN CONC (BEAKER) (test code=752)  33.1 g/dL  32.0-
36.0   

 

 RED CELL DISTRIBUTION WIDTH (BEAKER) (test code=412)  14.3 %  10.3-14.2   

 

 PLATELET COUNT (BEAKER) (test code=756)  155 10e3/ L  150-430   

 

 MEAN PLATELET VOLUME (BEAKER) (test code=754)  7.8 fL  6.5-10.5   

 

 NEUTROPHILS RELATIVE PERCENT (BEAKER) (test code=429)  54 %      

 

 LYMPHOCYTES RELATIVE PERCENT (BEAKER) (test code=430)  34 %      

 

 MONOCYTES RELATIVE PERCENT (BEAKER) (test code=431)  7 %      

 

 EOSINOPHILS RELATIVE PERCENT (BEAKER) (test code=432)  5 %      

 

 BASOPHILS RELATIVE PERCENT (BEAKER) (test code=437)  0 %      

 

 NEUTROPHILS ABSOLUTE COUNT (BEAKER) (test code=670)  2.42 10e3/ L  1.80-8.00   

 

 LYMPHOCYTES ABSOLUTE COUNT (BEAKER) (test code=414)  1.55 10e3/ L  1.48-4.50   

 

 MONOCYTES ABSOLUTE COUNT (BEAKER) (test code=415)  0.31 10e3/ L  0.00-1.30   

 

 EOSINOPHILS ABSOLUTE COUNT (BEAKER) (test code=416)  0.21 10e3/ L  0.00-0.50   

 

 BASOPHILS ABSOLUTE COUNT (BEAKER) (test code=417)  0.01 10e3/ L  0.00-0.20   





CT, [id number] 16:06:00FINAL REPORT PATIENT ID:   [id number]  ABDOMINAL 
AND PELVIS CT DATED 2017 COMPARISON: 2016 CLINICAL 
INFORMATION:  Abd pain, fever, abscess suspectedischiorectal or perirectal deep 
abscess suspected TECHNIQUE:  Axial images of the abdomen and pelvis were 
obtained from diaphragm to the pubic symphysis without GI or intravenous 
contrast.  This exam was performed according to our departmental dose-
optimization program, which includes automated exposure control, adjustment of 
the mA and/or kV according to patient size and/or use of interactive 
reconstruction technique. COMMENT: Multifocal groundglass pulmonary parenchyma 
disease is seen in the visualized lower lobes. Liver and spleen are normal in 
size without focal abnormality.  Gallbladder is distended. No gallstone or 
biliary dilatation is noted.  Pancreas and adrenals are unremarkable.   Left 
kidney is surgically absent. Right kidney is atrophic with a multiple simple 
and hemorrhagic cysts. A few stones are seen in the right kidney. A 
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 The small and 
large bowel are suboptimally evaluated secondary to lack of GI and intravenous 
contrast. Diverticular disease is seen in the large bowel. There is wall 
thickening in the distal descending and proximal sigmoid colon with 
inflammatory changes in the adjacent mesocolon suggestive of a diverticulitis. 
No adenopathy or ascites is seen in the abdomen or pelvis. IMPRESSION:  1. 
Diverticulosis with distal descending and proximal sigmoid diverticulitis.2. 
Status post left nephrectomy.3. Atrophic right kidney with multiple right 
simple and hemorrhagic cysts. Signed: [facility] Verified Date/Time:  
2017 16:06:39 Reading Location: [facility] C013Y CT Body Reading Room      
Electronically signed by: AMITA CHUNG M.D. on 2017 04:06 PM